# Patient Record
(demographics unavailable — no encounter records)

---

## 2025-04-24 NOTE — ASSESSMENT
[FreeTextEntry1] : Ms. Lombardi is a 39 year old woman with left breast pain, a left breast lump, nipple discharge, and abnormal findings on her breast imaging. Her breast exam today is without suspicious findings. The imaging is reviewed with her. We reviewed that breast pain is generally not associated with a breast malignancy. In many cases, breast pain is related to hormonal stimulation. Sometimes, it is due to an infection or an anatomic lesion such as a large cyst or fibroadenoma, and there is no evidence of such on her exam or imaging. A supportive bra and Tylenol or Ibuprofen are recommended for symptomatic relief.  The left breast lump that she feels is consistent with benign nodular breast parenchyma. Continued observation is recommended, and I would like to see her back for a follow-up in 6 months.   The differential diagnosis for nipple discharge ranges from the benign to the malignant. Nipple discharge that is suspicious is persistent, clear or bloody, unilateral, and spontaneous. Ms. Lombardi's nipple discharge is milky and hormonally related. Cessation of nipple squeezing is advised. She is having her prolactin level checked.  We will follow-up on the report of the breast MRI done yesterday. For the new right breast nodule, and the two left complicated cysts, a bilateral limited US is ordered for September.   She asked and the liver hemangioma is not related to her breast findings.  She understands and is comfortable with the plan. She is encouraged to call if any questions or concerns arise.

## 2025-04-24 NOTE — DATA REVIEWED
[FreeTextEntry1] : I have independently reviewed the reports and the images.   B/l mammogram 10/3/24 - heterogenously dense  - asymmetry in L LOQ - BIRADS 0   B/l US 10/3/24 - 0.9 cm nodule in R breast 7:00 N1, biopsied, decreased - 0.7 cm nodule in L breast 4:00 N5; 6 mo L US -  BIRADS 3   L mammogram 10/7/24 - asymmetry in L LOQ effaces - BIRADS 1   L limited US 10/7/24 - 0.6 cm complicated cyst in L breast 4:00 N5, and 0.4 cm complicated cyst vs. nodule in L breast 4:00 N2; may correspond to asymmetry - BIRADS 3   B/l US 3/12/25 - 0.8 cm nodule in R breast 2:00 N1, new - 0.8 cm nodule in R breast 7:00 N1, stable - 0.9 cm cyst in L breast 4:00 N5, stable - 0.6 cm cyst in L breast 4:00 N2, stable -  BIRADS 3   B/l mammogram 3/25/25 - heterogenously dense  - BIRADS 2

## 2025-04-24 NOTE — HISTORY OF PRESENT ILLNESS
[FreeTextEntry1] : Ms. Lombardi is a 39 year old woman who presents for a consultation for pain, a lump, nipple discharge, and abnormal findings on her breast imaging. Over the past 6 months she noticed pain in the left upper outer breast that recently radiated to the axilla; it is tender to pressure and increased before her menses. She also noticed a lump in the left upper outer breast that is the size of a pencil eraser and it hasn't changed. When she squeezes her nipples, she can elicit milky discharge; it is more on the left than the right. This does not occur spontaneously but only with squeezing which she does about once a month.   Her family history is not significant for any breast cancer.

## 2025-04-24 NOTE — PAST MEDICAL HISTORY
[Menarche Age ____] : age at menarche was [unfilled] [Definite ___ (Date)] : the last menstrual period was [unfilled] [Total Preg ___] : G[unfilled] [Live Births ___] : P[unfilled]  [Age At Live Birth ___] : Age at live birth: [unfilled] [History of Hormone Replacement Treatment] : has no history of hormone replacement treatment [FreeTextEntry7] : none. [FreeTextEntry8] : 20 mo

## 2025-04-24 NOTE — CONSULT LETTER
[Dear  ___] : Dear  [unfilled], [Consult Letter:] : I had the pleasure of evaluating your patient, [unfilled]. [Please see my note below.] : Please see my note below. [Consult Closing:] : Thank you very much for allowing me to participate in the care of this patient.  If you have any questions, please do not hesitate to contact me. [Sincerely,] : Sincerely, [DrLeonila  ___] : Dr. VEGA [FreeTextEntry3] : Amy Bhatti MD FACS

## 2025-04-24 NOTE — PHYSICAL EXAM
[Bra Size: ___] : Bra Size: [unfilled] [Normocephalic] : normocephalic [Atraumatic] : atraumatic [Sclera nonicteric] : sclera nonicteric [Supple] : supple [No Supraclavicular Adenopathy] : no supraclavicular adenopathy [No Cervical Adenopathy] : no cervical adenopathy [Clear to Auscultation Bilat] : clear to auscultation bilaterally [Normal Sinus Rhythm] : normal sinus rhythm [Examined in the supine and seated position] : examined in the supine and seated position [No dominant masses] : no dominant masses in right breast  [No dominant masses] : no dominant masses left breast [No Nipple Retraction] : no left nipple retraction [No Nipple Discharge] : no right nipple discharge [No Axillary Lymphadenopathy] : no left axillary lymphadenopathy [No Edema] : no edema [No Rashes] : no rashes [No Ulceration] : no ulceration [de-identified] : Squeezing manipulation by the patient elicits milky white discharge from 2 ducts on the nipple. Nodular breast parenchyma at area of concern in far UOQ [de-identified] : R hand dominant.